# Patient Record
Sex: MALE | URBAN - METROPOLITAN AREA
[De-identification: names, ages, dates, MRNs, and addresses within clinical notes are randomized per-mention and may not be internally consistent; named-entity substitution may affect disease eponyms.]

---

## 2017-01-30 ENCOUNTER — IMPORTED ENCOUNTER (OUTPATIENT)
Dept: URBAN - METROPOLITAN AREA CLINIC 9 | Facility: CLINIC | Age: 72
End: 2017-01-30

## 2017-03-21 ENCOUNTER — IMPORTED ENCOUNTER (OUTPATIENT)
Dept: URBAN - METROPOLITAN AREA CLINIC 9 | Facility: CLINIC | Age: 72
End: 2017-03-21

## 2017-05-09 ENCOUNTER — IMPORTED ENCOUNTER (OUTPATIENT)
Dept: URBAN - METROPOLITAN AREA CLINIC 9 | Facility: CLINIC | Age: 72
End: 2017-05-09

## 2017-11-03 ENCOUNTER — IMPORTED ENCOUNTER (OUTPATIENT)
Dept: URBAN - METROPOLITAN AREA CLINIC 9 | Facility: CLINIC | Age: 72
End: 2017-11-03

## 2018-01-08 ENCOUNTER — IMPORTED ENCOUNTER (OUTPATIENT)
Dept: URBAN - METROPOLITAN AREA CLINIC 9 | Facility: CLINIC | Age: 73
End: 2018-01-08

## 2018-05-08 NOTE — PATIENT DISCUSSION
Patient understands condition, prognosis and need for follow up care with retina because of ischemic appearance and headaches.

## 2018-05-11 NOTE — PATIENT DISCUSSION
Retrobulbar/Temp. unlikely GCA  given age and 5-6 week duration. Recommend MRI of brain and orbits  W/O Contrast. Follow-up with PCP, return in 2 week TPB.

## 2019-05-27 ENCOUNTER — IMPORTED ENCOUNTER (OUTPATIENT)
Dept: URBAN - METROPOLITAN AREA CLINIC 9 | Facility: CLINIC | Age: 74
End: 2019-05-27

## 2021-10-16 ASSESSMENT — KERATOMETRY
OD_K1POWER_DIOPTERS: 44
OS_AXISANGLE2_DEGREES: 168
OD_AXISANGLE2_DEGREES: 12
OD_K2POWER_DIOPTERS: 45
OS_K2POWER_DIOPTERS: 45.25
OS_AXISANGLE_DEGREES: 78
OD_AXISANGLE_DEGREES: 102
OS_K1POWER_DIOPTERS: 44.5

## 2021-10-16 ASSESSMENT — VISUAL ACUITY
OD_CC: 20/20 - SN
OS_CC: 20/20 - SN
OS_CC: 20/25 SN
OD_CC: 20/25 - SN
OS_CC: 20/20 - SN
OD_CC: 20/20 SN
OS_CC: 20/20 - SN
OD_CC: 20/20 -2 SN
OS_SC: 20/200 SN
OD_CC: 20/20 SN
OD_SC: 20/200 SN
OS_CC: 20/20 SN

## 2021-10-16 ASSESSMENT — TONOMETRY
OS_IOP_MMHG: 16
OD_IOP_MMHG: 16
OD_IOP_MMHG: 20
OS_IOP_MMHG: 15
OS_IOP_MMHG: 16
OD_IOP_MMHG: 14
OS_IOP_MMHG: 14
OD_IOP_MMHG: 12

## 2022-07-06 RX ORDER — NAPROXEN SODIUM 220 MG
TABLET ORAL
COMMUNITY

## 2022-07-06 RX ORDER — BUPROPION HYDROCHLORIDE 300 MG/1
TABLET ORAL
COMMUNITY

## 2022-07-06 RX ORDER — CITALOPRAM 40 MG/1
TABLET ORAL
COMMUNITY

## 2022-07-06 RX ORDER — ROSUVASTATIN CALCIUM 20 MG/1
TABLET, COATED ORAL
COMMUNITY